# Patient Record
Sex: MALE | Race: WHITE | Employment: FULL TIME | URBAN - METROPOLITAN AREA
[De-identification: names, ages, dates, MRNs, and addresses within clinical notes are randomized per-mention and may not be internally consistent; named-entity substitution may affect disease eponyms.]

---

## 2020-07-21 ENCOUNTER — OFFICE VISIT (OUTPATIENT)
Dept: ORTHOPEDIC SURGERY | Age: 50
End: 2020-07-21
Payer: COMMERCIAL

## 2020-07-21 PROCEDURE — 99242 OFF/OP CONSLTJ NEW/EST SF 20: CPT | Performed by: ORTHOPAEDIC SURGERY

## 2020-07-21 NOTE — PROGRESS NOTES
SHOULDER CONSULTATION    Referring Provider: Dr. Walter Velazquez    Primary Care Provider: same    Chief Complaint    Shoulder Pain (right shoulder pain: increased over last 6 months: previoius surgery in 1987 by Dr. Jesus Benitez.)      History of Present Illness:  Raúl Singh is a 52 y.o. male who presents today quite frustrated with some recent exacerbations of right shoulder pain. He has been having trouble for the last 2 years and has been getting worse. He started playing some competitive softball in 2018 and this seems to of started the cascade of symptoms. He had surgery as noted above. He is also had instability surgery on the left shoulder. He describes a deep-seated pain radiating into the lateral deltoid. Pain is exacerbated with abduction forward elevation and extension. He has mild to moderate night pain. He is tried to treat himself with some rest, nonsteroidal anti-inflammatories and performing some of the exercises he remembers from previous surgery. This is not improved his symptoms. Is become quite frustratingly to affect his quality of day-to-day life    Pain Assessment  Location of Pain: Shoulder  Location Modifiers: Right  Severity of Pain: 5  Frequency of Pain: Constant  Result of Injury: Yes  Work-Related Injury: No  Are there other pain locations you wish to document?: No    Medical History:  Patient's medications, allergies, past medical, surgical, social and family histories were reviewed and updated as appropriate. Review of Systems:  Pertinent items are noted in HPI  Review of systems reviewed from Patient History Form dated on July 21 and available in the patient's chart under the Media tab. There were no vitals filed for this visit. General Exam:   Constitutional: Patient is adequately groomed with no evidence of malnutrition  Mental Status: The patient is oriented to time, place and person. The patient's mood and affect are appropriate.   Vascular: Examination reveals no swelling or calf tenderness. Peripheral pulses are palpable and 2+. Neurological: The patient has good coordination. There is no weakness or sensory deficit. Shoulder Examination:    Inspection: On inspection today he is a a well-appearing thin  male. No acute distress. He has no appreciable atrophy about the shoulder girdle. He has some mild protraction of the scapula in the resting position. He is nontender at a quite prominent acromioclavicular joint    Palpation: Mild tenderness through the anterolateral tuberosity only    Range of Motion: He has a 15 degrees internal rotation deficit otherwise full glenohumeral movement    Strength: He has good isometric rotator cuff strength with the exception of some pain inhibition in forward elevation and abduction    Special Tests: He has pain with dynamic active compression testing. Positive Jobes and O'Briens. Positive Neer and Walker. Skin: There are no rashes, ulcerations or lesions. Gait: Stable with no assist device    Spine good cervical rotation    Additional Comments:         Radiology:     X-rays obtained and reviewed in the office today include 4 views of the right shoulder. He does have some moderate acromioclavicular arthritis as noted above. He has a concentric glenohumeral joint. Perhaps some sclerosis of the greater tuberosity      Assessment : I have a clinical concern for the development of progressive rotator cuff pathology.   I am concerned about the two-year worsening of symptoms, the pain on provocative testing and the failed to improve with conservative management in the right hand of which 61-year-old working gentleman      Office Procedures:  Orders Placed This Encounter   Procedures    XR SHOULDER RIGHT (MIN 2 VIEWS)    MRI Shoulder Right WO Contrast     Standing Status:   Future     Standing Expiration Date:   7/21/2021     Scheduling Instructions:      [address]: ([phone]

## 2020-07-28 ENCOUNTER — TELEPHONE (OUTPATIENT)
Dept: ORTHOPEDIC SURGERY | Age: 50
End: 2020-07-28

## 2020-07-28 NOTE — TELEPHONE ENCOUNTER
Called patient to let them know of MRI SCAN approval. Alma Mckinney has been faxed auth# and demographic information. Patient was instructed to call the central scheduling department for a follow-up appointment after test is scheduled.

## 2020-08-18 ENCOUNTER — OFFICE VISIT (OUTPATIENT)
Dept: ORTHOPEDIC SURGERY | Age: 50
End: 2020-08-18
Payer: COMMERCIAL

## 2020-08-18 VITALS — WEIGHT: 185 LBS | HEIGHT: 72 IN | BODY MASS INDEX: 25.06 KG/M2

## 2020-08-18 PROCEDURE — 99213 OFFICE O/P EST LOW 20 MIN: CPT | Performed by: ORTHOPAEDIC SURGERY

## 2020-08-18 NOTE — LETTER
[address] [facility]   Surgery Precert & Billing Form:    DEMOGRAPHICS:                                                                                                       Patient Name:  Evelin Freeman  Patient :  1970   Patient SS#:      Patient Phone:  [phone] (home) [phone] (work) Alt. Patient Phone:    Patient Address:  [address] [address]    PCP:  Golden Raygoza MD  Insurance: Payor: Melly Ovalle / Plan: [address] [place] [address] / Product Type: *No Product type* /     DIAGNOSIS & PROCEDURE:                                                                                      Diagnosis:   1.  Nontraumatic complete tear of right rotator cuff      Diagnosis Code:  M75.121      Operation: right    SURGERY  INFORMATION  Date of Surgery:   10/28/2020  Location:  [facility]   Type:    Outpatient  23 hour hold:  No  Surgeon:              Sundar Nicholson MD      20     BILLING INFORMATION:                                                                                                Physician Procedure                                            CPT Codes        Right shoulder arthroscopy with rotator cuff repair, biceps tenodesis and distal clavicle excision  19589, [id number], [id number]                      [place], or Fellow Procedure                                      CPT Codes                     Precert information:
Preop Antibiotic Prophylaxis / DAY OF SURGERY    [x] Cefazolin IVPB per weight base protocol  ? Cefazolin 2 grams if <119.9 kg  ? Cefazolin 3 grams if ?120kg (?264 lbs. )  ? Pediatric(<12yo) Dosinmg/kg (maximum 2 grams)     If allergic to PCN/Cephalosporin  [x] Clindamycin (Cleocin®) 900 mg IVPB over 30 minutes   ADDITIONAL MEDICATIONS:    [x] Acetaminophen 1000 mg po 1 hour prior to procedure    [x] Celebrex 400 mg po 1 hour prior to procedure (ADULTS ONLY)   []  EXPAREL 1.3%  20 cc  Subcutaneous    **[place] AND [place]IM[place] ONLY**  []  Ortho Irrigation: Bacitracin 50,999 units and Polymixin B 500,000 unites mixed in a syringe.  OR to mix with 500 cc NS and use 200 cc for irrigation (FOR ALL PATIENTS WHO REQUIRED METAL IMPLANT OR GRAFT)   DVT PREVENTION:  [x]  Knee high MATEO Hose           Signature                                                 Date 20 1:23 PM                                             Milton Stark M.D                                                                                                 Scheduled By:  Cristin Cosby 20   Direct Tel: [phone]                     Direct Fax: [phone]

## 2020-08-18 NOTE — PROGRESS NOTES
Department of Orthopedic Surgery   Progress Note      Follow-Up: MRI results shoulder    Subjective:     Biran Sapp is a 26-year-old active gentleman who is been very frustrated with the persistence of shoulder pain and the failure of conservative treatment. He is done physical therapy and use anti-inflammatories. He is try to modify his activities. He has had previous arthroscopic intervention      Objective:     @IPVITALS(24)@    Review of Systems  Pertinent items are noted in HPI  Denies fever, chills, confusion, bowel/bladder active change. Review of systems reviewed from Patient History Form dated on August 18 and available in the patient's chart under the Media tab. Exam: Unchanged      Imaging: Review of his high-quality MRI imaging demonstrates a high-grade partial-thickness articular sided tear pattern. There is some edema and flattening of the long head biceps in the groove. Degenerative superior labral tear. There is some active edema at the acromioclavicular joint as well    Office Procedures:      Assessment:     #1. High-grade partial-thickness articular sided supraspinatus tear    #2. Superior labral lesion with suspected long head biceps intra-articular pathology    #3. Active acromioclavicular arthritis    Plan:      1: We had a good visit today in the office in which we outlined the pathology and treatment options. I was clear with Brian Sapp that the indication for surgery is really the failure of conservative treatment. At this point he is very frustrated and would like to proceed. Plan is for arthroscopy with rotator cuff repair, likely biceps tenodesis, arthroscopic distal clavicle excision. Informed consent was discussed with the patient. This included a detailed description of the procedure. Risks, benefits and alternatives specific to this diagnosis and procedure were outlined.   Standard surgical risks of anesthesia, bleeding, nerve damage, infection, need for further surgeries, disability and death also outlined. Verbal confirmation of informed consent was obtained. Signature obtained by the staff. Follow-Up Visit: Postoperatively            [address] [facility] ([facility])  Shoulder and Sports Medicine Surgery      This dictation was performed with a verbal recognition program (DRAGON) and it was checked for errors. It is possible that there are still dictated errors within this office note. If so, please bring any errors to my attention for an addendum. All efforts were made to ensure that this office note is accurate.

## 2020-09-10 ENCOUNTER — TELEPHONE (OUTPATIENT)
Dept: ORTHOPEDIC SURGERY | Age: 50
End: 2020-09-10

## 2021-09-24 DIAGNOSIS — Z01.812 PRE-PROCEDURE LAB EXAM: Primary | ICD-10-CM
